# Patient Record
Sex: FEMALE | Race: WHITE | NOT HISPANIC OR LATINO | Employment: UNEMPLOYED | URBAN - METROPOLITAN AREA
[De-identification: names, ages, dates, MRNs, and addresses within clinical notes are randomized per-mention and may not be internally consistent; named-entity substitution may affect disease eponyms.]

---

## 2021-04-06 ENCOUNTER — OFFICE VISIT (OUTPATIENT)
Dept: FAMILY MEDICINE CLINIC | Facility: CLINIC | Age: 26
End: 2021-04-06

## 2021-04-06 VITALS
RESPIRATION RATE: 16 BRPM | BODY MASS INDEX: 21.94 KG/M2 | TEMPERATURE: 97.5 F | HEIGHT: 64 IN | OXYGEN SATURATION: 99 % | SYSTOLIC BLOOD PRESSURE: 112 MMHG | DIASTOLIC BLOOD PRESSURE: 78 MMHG | WEIGHT: 128.5 LBS | HEART RATE: 80 BPM

## 2021-04-06 DIAGNOSIS — R30.0 DYSURIA: ICD-10-CM

## 2021-04-06 DIAGNOSIS — Z00.00 ANNUAL PHYSICAL EXAM: Primary | ICD-10-CM

## 2021-04-06 DIAGNOSIS — Z11.3 ROUTINE SCREENING FOR STI (SEXUALLY TRANSMITTED INFECTION): ICD-10-CM

## 2021-04-06 LAB
BILIRUB BLD-MCNC: NEGATIVE MG/DL
CLARITY, POC: CLEAR
COLOR UR: YELLOW
GLUCOSE UR STRIP-MCNC: NEGATIVE MG/DL
KETONES UR QL: NEGATIVE
LEUKOCYTE EST, POC: NEGATIVE
NITRITE UR-MCNC: NEGATIVE MG/ML
PH UR: 7 [PH] (ref 5–8)
PROT UR STRIP-MCNC: NEGATIVE MG/DL
RBC # UR STRIP: NEGATIVE /UL
SP GR UR: 1.02 (ref 1–1.03)
UROBILINOGEN UR QL: NORMAL

## 2021-04-06 PROCEDURE — 90471 IMMUNIZATION ADMIN: CPT | Performed by: NURSE PRACTITIONER

## 2021-04-06 PROCEDURE — 81003 URINALYSIS AUTO W/O SCOPE: CPT | Performed by: NURSE PRACTITIONER

## 2021-04-06 PROCEDURE — 90472 IMMUNIZATION ADMIN EACH ADD: CPT | Performed by: NURSE PRACTITIONER

## 2021-04-06 PROCEDURE — 90732 PPSV23 VACC 2 YRS+ SUBQ/IM: CPT | Performed by: NURSE PRACTITIONER

## 2021-04-06 PROCEDURE — 99395 PREV VISIT EST AGE 18-39: CPT | Performed by: NURSE PRACTITIONER

## 2021-04-06 PROCEDURE — 90715 TDAP VACCINE 7 YRS/> IM: CPT | Performed by: NURSE PRACTITIONER

## 2021-04-06 NOTE — PROGRESS NOTES
"Chief Complaint  Establish Care and Urinary Tract Infection (FU)    Subjective          Jena Plaza presents to [facility]  Jena presents as a new patient for a physical. She does report lower abdominal pain, recently treated at  for a UTI. She reported possible vaginal discharge. She was treated with IM rocephin and oral zithromax. She completed the medication and states symptoms resolved at that time but returned the next day to a lesser degree.     Established with Dr. Burns, GYN  Hx of bartholins Cyst, removed by gynecologist, will call to schedule follow up.  Pap is up to date.    She does not plan on getting a covid vaccine.   No recent tetanus and agreeable to get today. She does have a history of smoking and agrees to get a pneumovax.    She is currently not prescribed daily medication. She reports drinking alcohol daily and smokes marijuana at night to help with sleep. She has reduced her alcohol use down considerably while taking the antibiotics and feels increased anxiety. She is nervous to take any medication to manage anxiety.          Objective   Vital Signs:   /78   Pulse 80   Temp 97.5 °F (36.4 °C) (Temporal)   Resp 16   Ht 162.6 cm (64\")   Wt 58.3 kg (128 lb 8 oz)   SpO2 99%   BMI 22.06 kg/m²     Physical Exam  Vitals reviewed.   Constitutional:       Appearance: Normal appearance.   Cardiovascular:      Rate and Rhythm: Normal rate and regular rhythm.      Heart sounds: No murmur heard.   No friction rub. No gallop.    Pulmonary:      Effort: Pulmonary effort is normal. No respiratory distress.      Breath sounds: Normal breath sounds. No wheezing, rhonchi or rales.   Abdominal:      General: Bowel sounds are normal. There is no distension.      Palpations: Abdomen is soft. There is no mass.      Tenderness: There is abdominal tenderness (lower abdominal area).      Hernia: No hernia is present.      Comments: lo   Neurological:      Mental " Status: She is alert and oriented to person, place, and time.   Psychiatric:         Mood and Affect: Mood normal.        Result Review :                 Assessment and Plan    Diagnoses and all orders for this visit:    1. Annual physical exam (Primary)    2. Dysuria  -     POC Urinalysis Dipstick, Automated  -     Urine Culture - Urine, Urine, Clean Catch; Future    3. Routine screening for STI (sexually transmitted infection)  -     Hepatitis C Antibody  -     HIV-1/O/2 Ag/Ab w Reflex  -     RPR    Other orders  -     Pneumococcal Polysaccharide Vaccine 23-Valent (PPSV23) Greater Than or Equal To 3yo Subcutaneous / IM  -     Tdap Vaccine Greater Than or Equal To 6yo IM        Follow Up   Return in about 1 year (around 4/6/2022), or if symptoms worsen or fail to improve.  Patient was given instructions and counseling regarding her condition or for health maintenance advice. Please see specific information pulled into the AVS if appropriate.       Information/counseling provided to the patient regarding periodic ayanna maintenance recommendations, including but not limited to immunizations, diet/exercise/healthy lifestyle, laboratory, and other screenings. BMI is discussed. Appropriate exercise, diet, and weight plans are discussed.    Patient is active and eating healthy, she does travel [place] 11 states for work which limits her physical activity.  She does report symptoms are improved since receiving treatment for UTI at . She was treated with rocephin and zithromax, tolerated well. Denies any complication of medication. She is , her spouse is currently deployed.  Symptoms returned day after antibiotic completed but not nearly as bad. Now more intermittent.    UA is negative, will culture urine, previous STI work up negative, will provide routine screening for HIV/syphilis, and hep C    Does not plan on getting a covid vaccine, recommended pneumovax 23 and tdap, agrees and will proceed today

## 2021-04-07 LAB
HCV AB S/CO SERPL IA: <0.1 S/CO RATIO (ref 0–0.9)
HIV 1+2 AB+HIV1 P24 AG SERPL QL IA: NON REACTIVE
RPR SER QL: NORMAL

## 2021-04-08 LAB
BACTERIA UR CULT: NO GROWTH
BACTERIA UR CULT: NORMAL

## 2022-09-24 ENCOUNTER — APPOINTMENT (OUTPATIENT)
Dept: ULTRASOUND IMAGING | Facility: HOSPITAL | Age: 27
End: 2022-09-24

## 2022-09-24 ENCOUNTER — HOSPITAL ENCOUNTER (EMERGENCY)
Facility: HOSPITAL | Age: 27
Discharge: HOME | End: 2022-09-24
Attending: EMERGENCY MEDICINE | Admitting: EMERGENCY MEDICINE

## 2022-09-24 VITALS
SYSTOLIC BLOOD PRESSURE: 112 MMHG | RESPIRATION RATE: 18 BRPM | OXYGEN SATURATION: 98 % | TEMPERATURE: 97.6 F | HEART RATE: 89 BPM | HEIGHT: 64 IN | BODY MASS INDEX: 22.06 KG/M2 | DIASTOLIC BLOOD PRESSURE: 73 MMHG

## 2022-09-24 DIAGNOSIS — R10.2 PELVIC PAIN: Primary | ICD-10-CM

## 2022-09-24 LAB
B-HCG UR QL: NEGATIVE
BILIRUB UR QL STRIP: NEGATIVE
CLARITY UR: CLEAR
COLOR UR: YELLOW
GLUCOSE UR STRIP-MCNC: NEGATIVE MG/DL
HGB UR QL STRIP.AUTO: NEGATIVE
KETONES UR QL STRIP: NEGATIVE
LEUKOCYTE ESTERASE UR QL STRIP.AUTO: NEGATIVE
NITRITE UR QL STRIP: NEGATIVE
PH UR STRIP.AUTO: 5.5 [PH] (ref 5–8)
PROT UR QL STRIP: NEGATIVE
SP GR UR STRIP: 1.01 (ref 1–1.03)
UROBILINOGEN UR QL STRIP: NORMAL

## 2022-09-24 PROCEDURE — 76830 TRANSVAGINAL US NON-OB: CPT

## 2022-09-24 PROCEDURE — 81003 URINALYSIS AUTO W/O SCOPE: CPT | Performed by: PHYSICIAN ASSISTANT

## 2022-09-24 PROCEDURE — 99283 EMERGENCY DEPT VISIT LOW MDM: CPT

## 2022-09-24 PROCEDURE — 81025 URINE PREGNANCY TEST: CPT | Performed by: PHYSICIAN ASSISTANT

## 2022-09-24 PROCEDURE — 76856 US EXAM PELVIC COMPLETE: CPT

## 2022-09-24 PROCEDURE — 93976 VASCULAR STUDY: CPT

## 2022-09-24 RX ORDER — DICLOFENAC SODIUM 75 MG/1
75 TABLET, DELAYED RELEASE ORAL 2 TIMES DAILY
Qty: 14 TABLET | Refills: 0 | Status: SHIPPED | OUTPATIENT
Start: 2022-09-24

## 2022-09-24 NOTE — ED PROVIDER NOTES
EMERGENCY DEPARTMENT ENCOUNTER    Room Number:  08/08  Date of encounter:  9/24/2022  PCP: Provider, No Known  Historian: Patient      HPI:  Chief Complaint: Pelvic pain  A complete HPI/ROS/PMH/PSH/SH/FH are unobtainable due to: N/A    Context: Jena Plaza is a 27 y.o. female with history of chronic pelvic pain who presents to the ED c/o pelvic pain.  Patient states that she has had right lower quadrant and pelvic pain for years that suddenly worsened tonight during intercourse.  Patient states that it is a sharp stabbing pain that is worse with certain movements.  Denies any associated nausea, vomiting, diarrhea, constipation, or any UTI symptoms.      PAST MEDICAL HISTORY  Active Ambulatory Problems     Diagnosis Date Noted   • No Active Ambulatory Problems     Resolved Ambulatory Problems     Diagnosis Date Noted   • No Resolved Ambulatory Problems     No Additional Past Medical History         PAST SURGICAL HISTORY  Past Surgical History:   Procedure Laterality Date   • BARTHOLIN GLAND CYST EXCISION     • CYST REMOVAL Right 2019    right overy   • FOOT SURGERY           FAMILY HISTORY  Family History   Problem Relation Age of Onset   • No Known Problems Mother    • Hypertension Father    • No Known Problems Brother    • Kidney cancer Maternal Aunt    • Liver cancer Maternal Grandmother    • Lung cancer Maternal Grandfather    • Breast cancer Paternal Grandmother          SOCIAL HISTORY  Social History     Socioeconomic History   • Marital status:    Tobacco Use   • Smoking status: Light Tobacco Smoker     Packs/day: 0.25     Types: Cigarettes   • Smokeless tobacco: Never Used   Vaping Use   • Vaping Use: Never used   Substance and Sexual Activity   • Alcohol use: Yes     Alcohol/week: 7.0 standard drinks     Types: 7 Shots of liquor per week     Comment: Daily   • Drug use: Yes     Types: Marijuana   • Sexual activity: Yes     Partners: Male         ALLERGIES  [place] [fish oil] and  Nickel        REVIEW OF SYSTEMS  Review of Systems     All systems reviewed and negative except for those discussed in HPI.       PHYSICAL EXAM    I have reviewed the triage vital signs and nursing notes.    ED Triage Vitals [09/24/22 0225]   Temp Heart Rate Resp BP SpO2   97.6 °F (36.4 °C) 103 18 121/86 99 %      Temp src Heart Rate Source Patient Position BP Location FiO2 (%)   Tympanic Monitor Standing Right arm --       Physical Exam  GENERAL: Alert and orient x3, upset, not distressed  HENT: nares patent  EYES: no scleral icterus  CV: regular rhythm, regular rate  RESPIRATORY: normal effort  ABDOMEN: soft/nontender, no rebound or guarding  MUSCULOSKELETAL: no deformity  NEURO: alert, moves all extremities, follows commands  SKIN: warm, dry, no rashes      LAB RESULTS  Recent Results (from the past 24 hour(s))   Pregnancy, Urine - Urine, Clean Catch    Collection Time: 09/24/22  3:13 AM    Specimen: Urine, Clean Catch   Result Value Ref Range    HCG, Urine QL Negative Negative   Urinalysis With Microscopic If Indicated (No Culture) - Urine, Clean Catch    Collection Time: 09/24/22  3:13 AM    Specimen: Urine, Clean Catch   Result Value Ref Range    Color, UA Yellow Yellow, Straw    Appearance, UA Clear Clear    pH, UA 5.5 5.0 - 8.0    Specific Gravity, UA 1.013 1.005 - 1.030    Glucose, UA Negative Negative    Ketones, UA Negative Negative    Bilirubin, UA Negative Negative    Blood, UA Negative Negative    Protein, UA Negative Negative    Leuk Esterase, UA Negative Negative    Nitrite, UA Negative Negative    Urobilinogen, UA 0.2 E.U./dL 0.2 - 1.0 E.U./dL       Ordered the above labs and independently reviewed the results.        RADIOLOGY  No Radiology Exams Resulted Within Past 24 Hours    I ordered the above noted radiological studies. Reviewed by me and discussed with radiologist.  See dictation for official radiology interpretation.      PROCEDURES    Procedures      MEDICATIONS GIVEN IN ER    Medications  - No data to display      PROGRESS, DATA ANALYSIS, CONSULTS, AND MEDICAL DECISION MAKING    All labs have been independently reviewed by me.  All radiology studies have been reviewed by me and discussed with radiologist dictating the report.   EKG's independently viewed and interpreted by me.  Discussion below represents my analysis of pertinent findings related to patient's condition, differential diagnosis, treatment plan and final disposition.    DDx: Includes not limited to ectopic pregnancy, ovarian torsion, ovarian cyst, UTI, kidney stone, pelvic pain    ED Course as of 09/24/22 0503   Sat Sep 24, 2022   0315 Patient states that her symptoms are currently improved, is very concerned about the costs of healthcare and is declining an evaluation because this pain feels exactly like her previous episodes of pelvic pain.  Was able to convince patient to obtain a pregnancy test to rule out ectopic and ultrasound to rule out torsion.  Patient agreed. [LEVI]   0415 HCG, Urine QL: Negative [LEVI]   0415 Leukocytes, UA: Negative [LEVI]   0415 Nitrite, UA: Negative [LEVI]   0433 Patient requesting discharge, states that she continues to be pain-free and has no complaints.  Expressed that we do not have the official radiology report, but that we would call if anything were to come back abnormal since she is pain-free and her symptoms have resolved.  Patient was given strict return to ER precautions, she expressed understanding and agrees with plan. [LEVI]      ED Course User Index  [LEVI] Milan Ceja PA       MDM: Patient's pregnancy test is negative and therefore ectopic is ruled out, and ultrasound shows no evidence for an ovarian torsion.  Patient's pain is resolved, vital signs are stable, and she is safe for discharge home.  Patient's been instructed to follow-up with her GYN for further evaluation.    PPE: The patient wore a surgical mask throughout the entire patient encounter. I wore an N95.    AS OF 05:03 EDT  VITALS:    BP - 112/73  HR - 89  TEMP - 97.6 °F (36.4 °C) (Tympanic)  O2 SATS - 98%        DIAGNOSIS  Final diagnoses:   Pelvic pain         DISPOSITION  DISCHARGE    Patient discharged in stable condition.    Reviewed implications of results, diagnosis, meds, responsibility to follow up, warning signs and symptoms of possible worsening, potential complications and reasons to return to ER.    Patient/Family voiced understanding of above instructions.    Discussed plan for discharge, as there is no emergent indication for admission. Patient referred to primary care provider for BP management due to today's BP. Pt/family is agreeable and understands need for follow up and repeat testing.  Pt is aware that discharge does not mean that nothing is wrong but it indicates no emergency is present that requires admission and they must continue care with follow-up as given below or physician of their choice.     FOLLOW-UP  Aspen De La Fuente MD  [address] [facility] [address]  [place] [id number] [phone]    Schedule an appointment as soon as possible for a visit            Medication List      New Prescriptions    diclofenac 75 MG EC tablet  Commonly known as: VOLTAREN  Take 1 tablet by mouth 2 (Two) Times a Day.           Where to Get Your Medications      These medications were sent to [id number] - [place], [place] - [address] AT [facility] - [phone] [facility] [phone] FX  [address], [facility] [address]    Phone: [phone]   · diclofenac 75 MG EC tablet           Note Disclaimer: At [facility], we believe that sharing information builds trust and better relationships. You are receiving this note because you recently visited [facility]. It is possible you will see health information before a provider has talked with you about it. This kind of information can be easy to misunderstand. To help you fully understand what it means for your  health, we urge you to discuss this note with your provider.       Milan Ceja PA  09/24/22 0508

## 2022-09-24 NOTE — ED NOTES
Patient to ED per private vehicle from home w/ reports of RLQ stabbing abd pain starting approx 1.5 h PTA. Patient denies pain during triage. Patient anxious.    Patient and staff w/ appropriate PPE in place throughout encounter.

## 2022-09-24 NOTE — DISCHARGE INSTRUCTIONS
Home, rest, medicine as prescribed, follow up with your GYN for further evaluation and PCP for recheck. Return to care if symptoms worsen or with further concerns.

## 2022-09-24 NOTE — ED PROVIDER NOTES
MD ATTESTATION NOTE    The KAIN and I have discussed this patient's history, physical exam, and treatment plan.    I provided a substantive portion of the care of this patient. I personally performed the physical exam, in its entirety. The attached note describes my personal findings.      Sam Bonner is a 27 y.o. female who presents to the ED c/o pelvic pain.  Right lower abdomen and pelvic pain that was worse tonight while having sex.  States that sharp and stabbing.  Has been improving.      On exam:  GENERAL: not distressed  HENT: nares patent  EYES: no scleral icterus  CV: regular rhythm, regular rate  RESPIRATORY: normal effort  ABDOMEN: soft, nontender  MUSCULOSKELETAL: no deformity  NEURO: alert, moves all extremities, follows commands  SKIN: warm, dry    Labs  Recent Results (from the past 24 hour(s))   Pregnancy, Urine - Urine, Clean Catch    Collection Time: 09/24/22  3:13 AM    Specimen: Urine, Clean Catch   Result Value Ref Range    HCG, Urine QL Negative Negative   Urinalysis With Microscopic If Indicated (No Culture) - Urine, Clean Catch    Collection Time: 09/24/22  3:13 AM    Specimen: Urine, Clean Catch   Result Value Ref Range    Color, UA Yellow Yellow, Straw    Appearance, UA Clear Clear    pH, UA 5.5 5.0 - 8.0    Specific Gravity, UA 1.013 1.005 - 1.030    Glucose, UA Negative Negative    Ketones, UA Negative Negative    Bilirubin, UA Negative Negative    Blood, UA Negative Negative    Protein, UA Negative Negative    Leuk Esterase, UA Negative Negative    Nitrite, UA Negative Negative    Urobilinogen, UA 0.2 E.U./dL 0.2 - 1.0 E.U./dL       Radiology  US Pelvis Complete    Result Date: 9/24/2022  Patient: SAM BONNER  Time Out: 06:39 Exam(s): US PELVIS EXAM:   US Pelvis Transabdominal, Complete CLINICAL HISTORY:    Reason for exam: Right-sided pelvic pain. TECHNIQUE:   Real-time complete transabdominal pelvic ultrasound with image documentation. COMPARISON:   No relevant prior studies  available. FINDINGS:   Uterus cervix:  Uterus measures 7.6 x 3.8 x 5.1 cm.  Endometrial stripe measures 7 mm.  No myometrial mass.   Right ovary:  The right ovary measures 4.9 x 1.8 x 4.0 cm and demonstrates normal blood flow.   Left ovary:  The left ovary measures 3.6 x 1.8 x 2.3 cm and demonstrates normal blood flow.   Free fluid:  Small amount of pelvic free fluid adjacent to the right ovary and in the cul-de-sac.   Bladder:  Unremarkable as visualized.  Wall is normal thickness for degree of distention. IMPRESSION:       Normal pelvic ultrasound showing no evidence of ovarian torsion.     Electronically signed by Vesna Dillon MD on 09-24-22 at 0639      Medical Decision Making:  ED Course as of 09/24/22 0651   Sat Sep 24, 2022   0315 Patient states that her symptoms are currently improved, is very concerned about the costs of healthcare and is declining an evaluation because this pain feels exactly like her previous episodes of pelvic pain.  Was able to convince patient to obtain a pregnancy test to rule out ectopic and ultrasound to rule out torsion.  Patient agreed. [LEVI]   0415 HCG, Urine QL: Negative [LEVI]   0415 Leukocytes, UA: Negative [LEVI]   0415 Nitrite, UA: Negative [LEVI]   0433 Patient requesting discharge, states that she continues to be pain-free and has no complaints.  Expressed that we do not have the official radiology report, but that we would call if anything were to come back abnormal since she is pain-free and her symptoms have resolved.  Patient was given strict return to ER precautions, she expressed understanding and agrees with plan. [LEVI]      ED Course User Index  [LEVI] Milan Ceja PA           PPE: Both the patient and I wore a surgical mask throughout the entire patient encounter. I wore protective goggles.     Diagnosis  Final diagnoses:   Pelvic pain        Sampson Ames MD  09/24/22 0697